# Patient Record
Sex: FEMALE | Race: AMERICAN INDIAN OR ALASKA NATIVE
[De-identification: names, ages, dates, MRNs, and addresses within clinical notes are randomized per-mention and may not be internally consistent; named-entity substitution may affect disease eponyms.]

---

## 2019-10-06 ENCOUNTER — HOSPITAL ENCOUNTER (EMERGENCY)
Dept: HOSPITAL 5 - ED | Age: 79
Discharge: HOME | End: 2019-10-06
Payer: MEDICARE

## 2019-10-06 VITALS — SYSTOLIC BLOOD PRESSURE: 108 MMHG | DIASTOLIC BLOOD PRESSURE: 54 MMHG

## 2019-10-06 DIAGNOSIS — F03.90: ICD-10-CM
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
## 2019-10-06 DIAGNOSIS — I10: ICD-10-CM

## 2019-10-06 DIAGNOSIS — Z79.899: ICD-10-CM
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
## 2019-10-06 DIAGNOSIS — I25.2: ICD-10-CM

## 2019-10-06 DIAGNOSIS — S60.455A: Primary | ICD-10-CM

## 2019-10-06 DIAGNOSIS — Z95.0: ICD-10-CM
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
## 2019-10-06 PROCEDURE — A6250 SKIN SEAL PROTECT MOISTURIZR: HCPCS

## 2019-10-06 NOTE — EMERGENCY DEPARTMENT REPORT
ED Extremity Problem HPI





- General


Chief complaint: Extremity Injury, Upper


Stated complaint: BUBBLE ON FINGER/RING STUCK


Time Seen by Provider: 10/06/19 19:41


Source: EMS


Mode of arrival: Stretcher


Limitations: Altered Mental Status





- History of Present Illness


Initial comments: 


78 yo  female presents with son who states that patient is 

currently in the care of a nursing home. He states that is mother has dementia 

and he received a call from the nursing home notifying him that his mother has 

finger swelling and and rings stuck that can not be removed as of today.





-: days(s)


Location: left, upper extremity


Radiation: none


Severity scale (0 -10): 3


Quality: aching


Consistency: constant


Improves with: nothing


Worsens with: weight bearing


Associated Symptoms: denies other symptoms





- Related Data


                                Home Medications











 Medication  Instructions  Recorded  Confirmed  Last Taken


 


Amlodipine Besylate/Benazepril 5 mg PO DAILY 09/12/14 09/05/18 09/11/14





[amLODIPine-Benazepril 5/10 mg]    


 


Carvedilol [Coreg] 6.25 mg PO BID 09/12/14 09/05/18 09/11/14


 


hydroCHLOROthiazide [HCTZ] 25 mg PO QDAY 09/12/14 09/05/18 09/11/14


 


Apixaban [Eliquis] 5 mg PO QDAY 10/06/19 10/06/19 Unknown








                                  Previous Rx's











 Medication  Instructions  Recorded  Last Taken  Type


 


DOXYCYCLINE Hyclate [Vibramycin 100 mg PO Q12HR 10 Days #20 capsule 10/06/19 

Unknown Rx





CAP]    











                                    Allergies











Allergy/AdvReac Type Severity Reaction Status Date / Time


 


No Known Allergies Allergy   Unverified 09/12/14 10:25














ED Review of Systems


ROS: 


Stated complaint: BUBBLE ON FINGER/RING STUCK


Other details as noted in HPI








ED Past Medical Hx





- Past Medical History


Previous Medical History?: Yes


Hx Hypertension: Yes


Hx Heart Attack/AMI: Yes


Hx Diabetes: No


Hx Asthma: No


Hx COPD: No


Hx Dementia: Yes


Additional medical history: Dementia





- Surgical History


Past Surgical History?: Yes


Hx Pacemaker: Yes





- Social History


Smoking Status: Never Smoker





- Medications


Home Medications: 


                                Home Medications











 Medication  Instructions  Recorded  Confirmed  Last Taken  Type


 


Amlodipine Besylate/Benazepril 5 mg PO DAILY 09/12/14 09/05/18 09/11/14 History





[amLODIPine-Benazepril 5/10 mg]     


 


Carvedilol [Coreg] 6.25 mg PO BID 09/12/14 09/05/18 09/11/14 History


 


hydroCHLOROthiazide [HCTZ] 25 mg PO QDAY 09/12/14 09/05/18 09/11/14 History


 


Apixaban [Eliquis] 5 mg PO QDAY 10/06/19 10/06/19 Unknown History


 


DOXYCYCLINE Hyclate [Vibramycin 100 mg PO Q12HR 10 Days #20 capsule 10/06/19  

Unknown Rx





CAP]     














ED Physical Exam





- General


Limitations: Altered Mental Status


General appearance: alert, in no apparent distress





- Head


Head exam: Present: atraumatic, normocephalic





- Eye


Eye exam: Present: normal appearance


Pupils: Present: normal accommodation





- ENT


ENT exam: Present: normal exam, normal orophraynx





- Neck


Neck exam: Present: normal inspection, full ROM





- Respiratory


Respiratory exam: Present: normal lung sounds bilaterally





- Cardiovascular


Cardiovascular Exam: Present: regular rate, normal rhythm, normal heart sounds





- GI/Abdominal


GI/Abdominal exam: Present: soft, normal bowel sounds





- Rectal


Rectal exam: Present: deferred





- Expanded Upper Extremity Exam


  ** Left


Hand Wrist exam: Present: other (L third finger swollen at PIP with 3 rings 

present that can not be removed.)


Neuro motor exam: Present: wrist extension intact, thumb opposition intact, 

thumb IP flexion intact, thumb adduction intact, fingers 2-5 abduction intact


Neurosensory exam: Present: 2-point discrimination


Vascular: Present: normal capillary refill





- Neurological Exam


Neurological exam: Present: alert, altered, oriented X3





- Psychiatric


Psychiatric exam: Present: normal affect, normal mood





- Skin


Skin exam: Present: erythema (erythema, inflammation and drainage present at 

area of irritation at the L 3rd finger PIP)





ED Course


                                   Vital Signs











  10/06/19





  19:35


 


Temperature 98.1 F


 


Pulse Rate 59 L


 


Respiratory 18





Rate 


 


Blood Pressure 118/57


 


O2 Sat by Pulse 100





Oximetry 














- Procedure Description


Procedures done: Neuro exam and capillary refill was checked before and was WNL.

6 cc of 1% Lidocaine used at the interdigital spaces of the finger 2-3 and 3-4 

on the L hand. Ringer cutter was utilized and the rings were removed 

successfully. Neuro exam and capillary refill were intact in post-procedure 

check. Pt tolerated procedure well.





ED Medical Decision Making





- Medical Decision Making


The pt and her son were explained that the rings observed on exam need to be 

removed. They verbalized understanding and agreed for the procedure to remove 

the rings.  Neuro exam and capillary refill was checked before and was WNL. 6 cc

of 1% Lidocaine used at the interdigital spaces of the finger 2-3 and 3-4 on the

L hand. Ringer cutter was utilized and the rings were removed successfully. 

Neuro exam and capillary refill were intact in post-procedure check. Pt 

tolerated procedure well. The pt and her son were instructed to keep the 

sensitive area clean, take oral antibiotics as prescribed, and apply otc 

Neosporin for topical skin coverage. Pt will f/u with PCP in 3-5 days and see ED

as needed.





Critical care attestation.: 


If time is entered above; I have spent that time in minutes in the direct care 

of this critically ill patient, excluding procedure time.








ED Disposition


Clinical Impression: 


 Foreign body





Disposition: DC-01 TO HOME OR SELFCARE


Is pt being admited?: No


Does the pt Need Aspirin: No


Condition: Stable


Additional Instructions: 


The pt and her son were instructed to keep the sensitive area clean, take oral 

antibiotics as prescribed, and apply otc Neosporin for topical skin coverage. Pt

will f/u with PCP in 3-5 days and see ED as needed.


Prescriptions: 


DOXYCYCLINE Hyclate [Vibramycin CAP] 100 mg PO Q12HR 10 Days #20 capsule


Referrals: 


PRIMARY CARE,MD [Primary Care Provider] - 3-5 Days


Time of Disposition: 23:08